# Patient Record
Sex: FEMALE | Race: ASIAN | NOT HISPANIC OR LATINO | Employment: FULL TIME | URBAN - METROPOLITAN AREA
[De-identification: names, ages, dates, MRNs, and addresses within clinical notes are randomized per-mention and may not be internally consistent; named-entity substitution may affect disease eponyms.]

---

## 2023-06-07 LAB
ALANINE AMINOTRANSFERASE (SGPT) (U/L) IN SER/PLAS: 11 U/L (ref 7–45)
ALBUMIN (G/DL) IN SER/PLAS: 4.3 G/DL (ref 3.4–5)
ALKALINE PHOSPHATASE (U/L) IN SER/PLAS: 42 U/L (ref 33–110)
ANION GAP IN SER/PLAS: 10 MMOL/L (ref 10–20)
ASPARTATE AMINOTRANSFERASE (SGOT) (U/L) IN SER/PLAS: 17 U/L (ref 9–39)
BILIRUBIN TOTAL (MG/DL) IN SER/PLAS: 0.5 MG/DL (ref 0–1.2)
CALCIUM (MG/DL) IN SER/PLAS: 9.3 MG/DL (ref 8.6–10.3)
CARBON DIOXIDE, TOTAL (MMOL/L) IN SER/PLAS: 28 MMOL/L (ref 21–32)
CHLORIDE (MMOL/L) IN SER/PLAS: 104 MMOL/L (ref 98–107)
CREATININE (MG/DL) IN SER/PLAS: 0.79 MG/DL (ref 0.5–1.05)
ERYTHROCYTE DISTRIBUTION WIDTH (RATIO) BY AUTOMATED COUNT: 15.1 % (ref 11.5–14.5)
ERYTHROCYTE MEAN CORPUSCULAR HEMOGLOBIN CONCENTRATION (G/DL) BY AUTOMATED: 30.7 G/DL (ref 32–36)
ERYTHROCYTE MEAN CORPUSCULAR VOLUME (FL) BY AUTOMATED COUNT: 79 FL (ref 80–100)
ERYTHROCYTES (10*6/UL) IN BLOOD BY AUTOMATED COUNT: 5.06 X10E12/L (ref 4–5.2)
GFR FEMALE: >90 ML/MIN/1.73M2
GLUCOSE (MG/DL) IN SER/PLAS: 93 MG/DL (ref 74–99)
HEMATOCRIT (%) IN BLOOD BY AUTOMATED COUNT: 40.1 % (ref 36–46)
HEMOGLOBIN (G/DL) IN BLOOD: 12.3 G/DL (ref 12–16)
HEPATITIS B VIRUS CORE AB (PRESENCE) IN SER/PLAS BY IMM: NONREACTIVE
HEPATITIS B VIRUS SURFACE AG PRESENCE IN SERUM: NONREACTIVE
HEPATITIS C VIRUS AB PRESENCE IN SERUM: NONREACTIVE
HIV 1/ 2 AG/AB SCREEN: NONREACTIVE
LEUKOCYTES (10*3/UL) IN BLOOD BY AUTOMATED COUNT: 6.9 X10E9/L (ref 4.4–11.3)
PLATELETS (10*3/UL) IN BLOOD AUTOMATED COUNT: 257 X10E9/L (ref 150–450)
POTASSIUM (MMOL/L) IN SER/PLAS: 4.2 MMOL/L (ref 3.5–5.3)
PROTEIN TOTAL: 7.8 G/DL (ref 6.4–8.2)
SODIUM (MMOL/L) IN SER/PLAS: 138 MMOL/L (ref 136–145)
UREA NITROGEN (MG/DL) IN SER/PLAS: 9 MG/DL (ref 6–23)

## 2023-06-09 LAB
NIL(NEG) CONTROL SPOT COUNT: NORMAL
PANEL A SPOT COUNT: 0
PANEL B SPOT COUNT: 0
POS CONTROL SPOT COUNT: NORMAL
T-SPOT. TB INTERPRETATION: NEGATIVE

## 2023-10-06 DIAGNOSIS — L40.0 PSORIASIS VULGARIS: Primary | ICD-10-CM

## 2023-10-10 RX ORDER — RISANKIZUMAB-RZAA 150 MG/ML
INJECTION SUBCUTANEOUS
Qty: 1 ML | Refills: 1 | Status: SHIPPED | OUTPATIENT
Start: 2023-10-10 | End: 2024-01-24 | Stop reason: SDUPTHER

## 2023-10-11 ENCOUNTER — PHARMACY VISIT (OUTPATIENT)
Dept: PHARMACY | Facility: CLINIC | Age: 38
End: 2023-10-11
Payer: COMMERCIAL

## 2023-10-11 PROCEDURE — RXMED WILLOW AMBULATORY MEDICATION CHARGE

## 2023-10-18 ENCOUNTER — TELEPHONE (OUTPATIENT)
Dept: DERMATOLOGY | Facility: CLINIC | Age: 38
End: 2023-10-18

## 2023-10-18 NOTE — TELEPHONE ENCOUNTER
Received a fax from [employer] re: Maty MONREAL.  More information needed.  Request for last office visit notes. Faxed requested information to [phone], Ref Case #: [id number].   Rylee Barnes LPN

## 2023-10-25 ENCOUNTER — SPECIALTY PHARMACY (OUTPATIENT)
Dept: PHARMACY | Facility: CLINIC | Age: 38
End: 2023-10-25

## 2023-10-30 ENCOUNTER — SPECIALTY PHARMACY (OUTPATIENT)
Dept: PHARMACY | Facility: CLINIC | Age: 38
End: 2023-10-30

## 2024-01-17 ENCOUNTER — PHARMACY VISIT (OUTPATIENT)
Dept: PHARMACY | Facility: CLINIC | Age: 39
End: 2024-01-17
Payer: COMMERCIAL

## 2024-01-17 ENCOUNTER — SPECIALTY PHARMACY (OUTPATIENT)
Dept: PHARMACY | Facility: CLINIC | Age: 39
End: 2024-01-17

## 2024-01-17 PROCEDURE — RXMED WILLOW AMBULATORY MEDICATION CHARGE

## 2024-01-24 ENCOUNTER — OFFICE VISIT (OUTPATIENT)
Dept: DERMATOLOGY | Facility: CLINIC | Age: 39
End: 2024-01-24
Payer: COMMERCIAL

## 2024-01-24 DIAGNOSIS — M25.50 ARTHRALGIA, UNSPECIFIED JOINT: Primary | ICD-10-CM

## 2024-01-24 DIAGNOSIS — L40.9 PSORIASIS: ICD-10-CM

## 2024-01-24 DIAGNOSIS — L40.0 PSORIASIS VULGARIS: ICD-10-CM

## 2024-01-24 PROCEDURE — 99213 OFFICE O/P EST LOW 20 MIN: CPT | Performed by: STUDENT IN AN ORGANIZED HEALTH CARE EDUCATION/TRAINING PROGRAM

## 2024-01-24 PROCEDURE — 1036F TOBACCO NON-USER: CPT | Performed by: STUDENT IN AN ORGANIZED HEALTH CARE EDUCATION/TRAINING PROGRAM

## 2024-01-24 RX ORDER — RISANKIZUMAB-RZAA 150 MG/ML
INJECTION SUBCUTANEOUS
Qty: 1 ML | Refills: 3 | Status: SHIPPED | OUTPATIENT
Start: 2024-01-24 | End: 2025-01-23

## 2024-01-24 ASSESSMENT — DERMATOLOGY QUALITY OF LIFE (QOL) ASSESSMENT
RATE HOW BOTHERED YOU ARE BY SYMPTOMS OF YOUR SKIN PROBLEM (EG, ITCHING, STINGING BURNING, HURTING OR SKIN IRRITATION): 3
DATE THE QUALITY-OF-LIFE ASSESSMENT WAS COMPLETED: 66863
RATE HOW BOTHERED YOU ARE BY EFFECTS OF YOUR SKIN PROBLEMS ON YOUR ACTIVITIES (EG, GOING OUT, ACCOMPLISHING WHAT YOU WANT, WORK ACTIVITIES OR YOUR RELATIONSHIPS WITH OTHERS): 0 - NEVER BOTHERED
RATE HOW BOTHERED YOU ARE BY SYMPTOMS OF YOUR SKIN PROBLEM (EG, ITCHING, STINGING BURNING, HURTING OR SKIN IRRITATION): 3
RATE HOW EMOTIONALLY BOTHERED YOU ARE BY YOUR SKIN PROBLEM (FOR EXAMPLE, WORRY, EMBARRASSMENT, FRUSTRATION): 2
RATE HOW EMOTIONALLY BOTHERED YOU ARE BY YOUR SKIN PROBLEM (FOR EXAMPLE, WORRY, EMBARRASSMENT, FRUSTRATION): 2
WHAT SINGLE SKIN CONDITION LISTED BELOW IS THE PATIENT ANSWERING THE QUALITY-OF-LIFE ASSESSMENT QUESTIONS ABOUT: PSORIASIS
RATE HOW BOTHERED YOU ARE BY EFFECTS OF YOUR SKIN PROBLEMS ON YOUR ACTIVITIES (EG, GOING OUT, ACCOMPLISHING WHAT YOU WANT, WORK ACTIVITIES OR YOUR RELATIONSHIPS WITH OTHERS): 0 - NEVER BOTHERED
WHAT SINGLE SKIN CONDITION LISTED BELOW IS THE PATIENT ANSWERING THE QUALITY-OF-LIFE ASSESSMENT QUESTIONS ABOUT: PSORIASIS

## 2024-01-24 ASSESSMENT — PATIENT GLOBAL ASSESSMENT (PGA): WHAT IS THE PGA: PATIENT GLOBAL ASSESSMENT:  1 - CLEAR

## 2024-01-24 NOTE — PROGRESS NOTES
"Subjective   Celestino Felix is a 38 y.o. female who presents for the following: Psoriasis (LV: 7/19/23. Not currently flaring on body.  C/o itchy dry skin. Currently on Skyrizi (started 6/30/23), Cerave anti-itch moisturizer multiple times a day. Notes joint pain all day and generalized.  Notes that she will notice the joint pain worsens about 1 month after injection.  Takes Ibuprofen for pain. /).    The following portions of the chart were reviewed this encounter and updated as appropriate:         Review of Systems: Pertinent items are noted in HPI.    Objective   Well appearing patient in no apparent distress; mood and affect are within normal limits.    A focused examination was performed including extremities, including the arms, hands, fingers, and fingernails and the legs, feet, toes, and toenails, head, including the scalp, face, neck, nose, ears, eyelids, and lips, and chest, axillae, abdomen, back, and buttocks. All findings within normal limits unless otherwise noted below.    No plaques or PIH. No joint swelling.       Assessment/Plan   Arthralgia, unspecified joint    Related Procedures  Referral to Rheumatology  Follow Up In Dermatology - Established Patient    Psoriasis vulgaris    Related Medications  risankizumab-rzaa (Skyrizi) 150 mg/mL pen injector pen  INJECT 150MG (1 PEN) UNDER THE SKIN EVERY 12 WEEKS    Psoriasis    Mod-severe plaque psoriasis, well controlled on skyrizi without any significant adverse effects. Reports some injection site pain, but short lived and does not bother her too much. She wishes to continue the medication. Refills issued.   Last TB test neg 6/23  Her joint pains do not sound necessarily c/w psoriatic arthritis (last all day, located \"all over\"), but given concurrent mod-severe psoriasis, I would recommend she gets evaluated by rheum. Referral placed.         Scribe Attestation  By signing my name below, I, Rylee Barnes LPN , [place]   attest that this documentation has " been prepared under the direction and in the presence of Beka Mcnulty MD.

## 2024-02-01 ENCOUNTER — HOSPITAL ENCOUNTER (OUTPATIENT)
Dept: CARDIOLOGY | Facility: HOSPITAL | Age: 39
Discharge: HOME | End: 2024-02-01
Payer: COMMERCIAL

## 2024-02-01 DIAGNOSIS — R07.9 CHEST PAIN, UNSPECIFIED TYPE: Primary | ICD-10-CM

## 2024-02-01 DIAGNOSIS — R07.9 CHEST PAIN, UNSPECIFIED TYPE: ICD-10-CM

## 2024-02-01 LAB
AORTIC VALVE PEAK VELOCITY: 1.57 M/S
AV PEAK GRADIENT: 9.8 MMHG
AVA (PEAK VEL): 2.16 CM2
EJECTION FRACTION APICAL 4 CHAMBER: 65.6
EJECTION FRACTION: 68 %
LEFT ATRIUM VOLUME AREA LENGTH INDEX BSA: 18.6 ML/M2
LEFT VENTRICLE INTERNAL DIMENSION DIASTOLE: 4.75 CM (ref 3.5–6)
LEFT VENTRICULAR OUTFLOW TRACT DIAMETER: 1.8 CM
MITRAL VALVE E/A RATIO: 1.63
MITRAL VALVE E/E' RATIO: 7.63
RIGHT VENTRICLE FREE WALL PEAK S': 11 CM/S
TRICUSPID ANNULAR PLANE SYSTOLIC EXCURSION: 2.7 CM

## 2024-02-01 PROCEDURE — 93010 ELECTROCARDIOGRAM REPORT: CPT | Performed by: STUDENT IN AN ORGANIZED HEALTH CARE EDUCATION/TRAINING PROGRAM

## 2024-02-01 PROCEDURE — 93306 TTE W/DOPPLER COMPLETE: CPT

## 2024-02-01 PROCEDURE — 93306 TTE W/DOPPLER COMPLETE: CPT | Performed by: INTERNAL MEDICINE

## 2024-02-01 PROCEDURE — 93005 ELECTROCARDIOGRAM TRACING: CPT

## 2024-02-10 LAB
ATRIAL RATE: 60 BPM
P AXIS: 60 DEGREES
P OFFSET: 198 MS
P ONSET: 146 MS
PR INTERVAL: 146 MS
Q ONSET: 219 MS
QRS COUNT: 10 BEATS
QRS DURATION: 80 MS
QT INTERVAL: 420 MS
QTC CALCULATION(BAZETT): 420 MS
QTC FREDERICIA: 420 MS
R AXIS: 62 DEGREES
T AXIS: 52 DEGREES
T OFFSET: 429 MS
VENTRICULAR RATE: 60 BPM

## 2024-02-19 ENCOUNTER — TELEMEDICINE (OUTPATIENT)
Dept: PRIMARY CARE | Facility: CLINIC | Age: 39
End: 2024-02-19
Payer: COMMERCIAL

## 2024-02-19 DIAGNOSIS — L03.019 PARONYCHIA OF FINGER, UNSPECIFIED LATERALITY: Primary | ICD-10-CM

## 2024-02-19 PROCEDURE — 99213 OFFICE O/P EST LOW 20 MIN: CPT | Performed by: STUDENT IN AN ORGANIZED HEALTH CARE EDUCATION/TRAINING PROGRAM

## 2024-02-19 PROCEDURE — 1036F TOBACCO NON-USER: CPT | Performed by: STUDENT IN AN ORGANIZED HEALTH CARE EDUCATION/TRAINING PROGRAM

## 2024-02-19 RX ORDER — MUPIROCIN 20 MG/G
OINTMENT TOPICAL 3 TIMES DAILY
Qty: 22 G | Refills: 0 | Status: SHIPPED | OUTPATIENT
Start: 2024-02-19 | End: 2024-02-29

## 2024-02-19 RX ORDER — DOXYCYCLINE 100 MG/1
100 CAPSULE ORAL 2 TIMES DAILY
Qty: 14 CAPSULE | Refills: 0 | Status: SHIPPED | OUTPATIENT
Start: 2024-02-19 | End: 2024-02-26

## 2024-02-19 ASSESSMENT — PATIENT HEALTH QUESTIONNAIRE - PHQ9
2. FEELING DOWN, DEPRESSED OR HOPELESS: NOT AT ALL
1. LITTLE INTEREST OR PLEASURE IN DOING THINGS: NOT AT ALL
SUM OF ALL RESPONSES TO PHQ9 QUESTIONS 1 AND 2: 0

## 2024-02-19 NOTE — PROGRESS NOTES
Subjective   Patient ID: Celestino Felix is a 38 y.o. female who presents for Infected Finger.  Today she is accompanied by alone.     HPI    1. Paronychia of finger of left hand index finger  Over the past week she started to notice a hangnail that ultimately increased and worsened to what appears to be an abscess/infection  She tried to drain this lesion over the weekend and unfortunately this continues to worsen    Finger is red, swollen, hot, and throbbing.      She has had one episode of this in the past and doxycycline worked well for her at that time.      Denies any fevers or chills or any other signs/symptoms  Wishes to discuss this further at today's visit       Current Outpatient Medications on File Prior to Visit   Medication Sig Dispense Refill    albuterol 108 (90 Base) MCG/ACT inhaler Inhale 2 puffs every 6 hours if needed for wheezing or shortness of breath.      levonorgestrel (Mirena) 21 mcg/24 hours (8 yrs) 52 mg IUD 52 mg by intrauterine route 1 time.      risankizumab-rzaa (Skyrizi) 150 mg/mL pen injector pen INJECT 150MG (1 PEN) UNDER THE SKIN EVERY 12 WEEKS 1 mL 3     No current facility-administered medications on file prior to visit.        No Known Allergies    Immunization History   Administered Date(s) Administered    Moderna SARS-CoV-2 Vaccination 01/06/2021, 02/05/2021, 12/18/2021    Pfizer COVID-19 vaccine, bivalent, age 12 years and older (30 mcg/0.3 mL) 11/05/2022       Review of Systems  All pertinent positive symptoms are included in the history of present illness.    All other systems have been reviewed and are negative and noncontributory to this patient's current ailments.     Objective   There were no vitals taken for this visit. - due to virtual nature of visit.   BSA: There is no height or weight on file to calculate BSA.  Growth percentiles: Facility age limit for growth %isaiah is 20 years. Facility age limit for growth %isaiah is 20 years.       Physical Exam    CONSTITUTIONAL -  well nourished, well developed, looks like stated age, in no acute distress, not ill-appearing, and not tired appearing  SKIN - red, swollen L index finger extending proximately from medial nail fold. Otherwise, normal skin color and pigmentation, normal skin turgor without rash, lesions, or nodules visualized  HEAD - no trauma, normocephalic  EYES - normal external exam  CHEST -no distressed breathing, good effort  NEUROLOGICAL - normal balance, normal motor, no ataxia  PSYCHIATRIC - alert, pleasant and cordial, age-appropriate      Assessment/Plan   1. 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  I encouraged supportive care such as rest, fluids and Advil/Tylenol as warranted  Return to the clinic in 7-10 days or sooner if symptoms worsen or persist as we will then further evaluate    Since this as been a recurring problem, I also provided an Rx for mupirocin antibiotic ointment to use in the future if you have a hangnail to avoid getting a secondary infection.

## 2024-03-05 NOTE — PROGRESS NOTES
Subjective   Patient ID: Celestino Felix is a 38 y.o. female who presents for Annual Exam, Abnormal Weight Gain, and Swelling in hands and feet.  Today she is accompanied by alone.     HPI  1. Health maintenance  Overall she feels very well without any major issues/complaints  Influenza vaccine October 2020 and Tdap vaccine 2019  Attempts to eat a well-balanced diet and exercises daily by running approximately 3 miles  Denies any tobacco use and drinks EtOH socially  Pap smear 1/2023 wnl hpv neg; Due: 1/2029  She is a mother of 3 children  Denies any chest pain, palpitations, shortness of breath, cough, nausea, vomiting, diarrhea, or any other acute signs/symptoms    2. Psoriasis/Psoriatic Arthritis  Has been on Skirizi for pso since July. Skin clear  PsA has been worse since December.   Patient has appointment with rheumatology in May  She is asking about getting lupus labs prior to that appointment    3. Weight gain  Patient endorses 20 pound weight gain  Weight in 2022 was 140, patient 155 today    4. Thalassemia/anemia  Patient has a past medical history of thalassemia but she is not fully sure on which type  Also along with this, she has a history of anemia  I did provide a requisition to follow-up with a hematologist at your last CPE.   Has yet to follow-up with a hematologist but is interested in doing so in the near future  Denies any excessive fatigue or any other signs/symptoms     5. Asthma  Has a past medical history of asthma that is very well controlled by using an albuterol inhaler on an as-needed basis  Denies any difficulty breathing and is just requesting a new prescription for an inhaler to be sent to her local pharmacy     6. Episodic Chest pain  20 min episode of mild chest pain while at rest  Endorses mental fogginess  Started in nov/dec  Reports having ECHO and EKG with her cardiologist which were within normal limits.     Current Outpatient Medications on File Prior to Visit   Medication Sig  "Dispense Refill    levonorgestrel (Mirena) 21 mcg/24 hours (8 yrs) 52 mg IUD 52 mg by intrauterine route 1 time.      [] mupirocin (Bactroban) 2 % ointment Apply topically 3 times a day for 10 days. apply to affected area 22 g 0    risankizumab-rzaa (Skyrizi) 150 mg/mL pen injector pen INJECT 150MG (1 PEN) UNDER THE SKIN EVERY 12 WEEKS 1 mL 3    [DISCONTINUED] albuterol 108 (90 Base) MCG/ACT inhaler Inhale 2 puffs every 6 hours if needed for wheezing or shortness of breath.       No current facility-administered medications on file prior to visit.        No Known Allergies    Immunization History   Administered Date(s) Administered    Hepatitis B vaccine, pediatric/adolescent (RECOMBIVAX, ENGERIX) 10/20/2010, 2010    Influenza, injectable, quadrivalent 2016, 2018    MMR vaccine, subcutaneous (MMR II) 1998    Moderna SARS-CoV-2 Vaccination 2021, 2021, 2021    Pfizer COVID-19 vaccine, bivalent, age 12 years and older (30 mcg/0.3 mL) 2022    Pneumococcal Conjugate PCV 7 1986    Tdap vaccine, age 7 year and older (BOOSTRIX, ADACEL) 10/05/2015, 2019         Review of Systems  All pertinent positive symptoms are included in the history of present illness.  All other systems have been reviewed and are negative and noncontributory to this patient's current ailments.     Objective   /70 (BP Location: Left arm, Patient Position: Sitting, BP Cuff Size: Adult)   Pulse 85   Ht 1.575 m (5' 2\")   Wt 70.3 kg (155 lb)   BMI 28.35 kg/m²   BSA: 1.75 meters squared  No visits with results within 1 Month(s) from this visit.   Latest known visit with results is:   Hospital Outpatient Visit on 2024   Component Date Value Ref Range Status    AV pk glynn 2024 1.57  m/s Final    LVOT diam 2024 1.80  cm Final    LV biplane EF 2024 68  % Final    MV E/A ratio 2024 1.63   Final    LA vol index A/L 2024 18.6  ml/m2 Final    MV avg E/e' " ratio 02/01/2024 7.63   Final    Tricuspid annular plane systolic e* 02/01/2024 2.7  cm Final    RV free wall pk S' 02/01/2024 11.00  cm/s Final    LVIDd 02/01/2024 4.75  cm Final    Aortic Valve Area by Continuity of* 02/01/2024 2.16  cm2 Final    AV pk grad 02/01/2024 9.8  mmHg Final    LV A4C EF 02/01/2024 65.6   Final       Physical Exam  CONSTITUTIONAL - well nourished, well developed, looks like stated age, in no acute distress, not ill-appearing, and not tired appearing  SKIN - normal skin color and pigmentation, normal skin turgor without rash, lesions, or nodules visualized  HEAD - no trauma, normocephalic  EYES - pupils are equal and reactive to light, extraocular muscles are intact, and normal external exam  ENT - TM's intact, no injection, no signs of infection, uvula midline, normal tongue movement and throat normal, no exudate, nasal passage without discharge and patent  NECK - supple without rigidity, no neck mass was observed  CHEST - clear to auscultation, no wheezing, no crackles and no rales, good effort  CARDIAC - regular rate and regular rhythm, no skipped beats, no murmur  ABDOMEN - no organomegaly, soft, nontender, nondistended, normal bowel sounds, no guarding/rebound/rigidity  EXTREMITIES - no edema, no deformities  NEUROLOGICAL - normal gait, normal balance, normal motor, no ataxia; alert, oriented and no focal signs  PSYCHIATRIC - alert, pleasant and cordial, age-appropriate  IMMUNOLOGIC - no cervical lymphadenopathy     Assessment/Plan       1. Health maintenance  Complete history and physical examination was performed  EKG reveals sinus bradycardia without acute changes  We will notify of test results once available and make treatment recommendations accordingly  Please continue eating a well-balanced diet and exercising regularly    2. Psoriasis/Psoriatic Arthritis  Has been on Skirizi for pso since July. Skin clear  PsA has been worse since December.   Patient has appointment with  rheumatology in May  She is asking about getting lupus labs prior to that appointment    3. Weight gain  We will order a TSH and A1c with your blood work.  We will notify of test results once available and make treatment recommendations accordingly     4. Thalassemia/anemia  We had a long conversation about your thalassemia/anemia  I did provide a requisition to follow-up with a hematologist  Please make an appointment at your earliest mutual convenience to be further evaluated/treated     5. Asthma  A refill for your albuterol inhaler was sent to your local pharmacy  Please continue using this on an as needed basis    6. Episodic Chest Pain  Since you have been worked up by cardiology we will continue to monitor  This could be secondary to anxiety.   If your symptoms get worse please call our office or go to your nearest emergency dept.     Please contact the office if you have any further question/concerns

## 2024-03-06 ENCOUNTER — OFFICE VISIT (OUTPATIENT)
Dept: PRIMARY CARE | Facility: CLINIC | Age: 39
End: 2024-03-06
Payer: COMMERCIAL

## 2024-03-06 ENCOUNTER — LAB (OUTPATIENT)
Dept: LAB | Facility: LAB | Age: 39
End: 2024-03-06
Payer: COMMERCIAL

## 2024-03-06 VITALS
SYSTOLIC BLOOD PRESSURE: 102 MMHG | HEIGHT: 62 IN | BODY MASS INDEX: 28.52 KG/M2 | WEIGHT: 155 LBS | HEART RATE: 85 BPM | DIASTOLIC BLOOD PRESSURE: 70 MMHG

## 2024-03-06 DIAGNOSIS — R63.5 WEIGHT GAIN: ICD-10-CM

## 2024-03-06 DIAGNOSIS — D56.9 THALASSEMIA, UNSPECIFIED TYPE: ICD-10-CM

## 2024-03-06 DIAGNOSIS — J45.909 ASTHMA, UNSPECIFIED ASTHMA SEVERITY, UNSPECIFIED WHETHER COMPLICATED, UNSPECIFIED WHETHER PERSISTENT (HHS-HCC): ICD-10-CM

## 2024-03-06 DIAGNOSIS — L40.0 PSORIASIS VULGARIS: ICD-10-CM

## 2024-03-06 DIAGNOSIS — M25.50 ARTHRALGIA, UNSPECIFIED JOINT: ICD-10-CM

## 2024-03-06 DIAGNOSIS — Z00.00 HEALTHCARE MAINTENANCE: Primary | ICD-10-CM

## 2024-03-06 DIAGNOSIS — R07.89 CHEST DISCOMFORT: ICD-10-CM

## 2024-03-06 DIAGNOSIS — Z00.00 HEALTHCARE MAINTENANCE: ICD-10-CM

## 2024-03-06 PROBLEM — R21 RASH AND OTHER NONSPECIFIC SKIN ERUPTION: Status: RESOLVED | Noted: 2023-05-24 | Resolved: 2024-03-06

## 2024-03-06 PROBLEM — L03.019 PARONYCHIA OF FINGER: Status: RESOLVED | Noted: 2024-03-06 | Resolved: 2024-03-06

## 2024-03-06 PROBLEM — M54.2 CERVICAL MUSCLE PAIN: Status: RESOLVED | Noted: 2024-03-06 | Resolved: 2024-03-06

## 2024-03-06 PROBLEM — R44.2 OLFACTORY HALLUCINATIONS: Status: RESOLVED | Noted: 2024-03-06 | Resolved: 2024-03-06

## 2024-03-06 PROBLEM — I88.9 CERVICAL LYMPHADENITIS: Status: RESOLVED | Noted: 2024-03-06 | Resolved: 2024-03-06

## 2024-03-06 PROBLEM — D64.9 ANEMIA: Status: ACTIVE | Noted: 2024-03-06
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
## 2024-03-06 PROBLEM — M25.512 LEFT SHOULDER PAIN: Status: RESOLVED | Noted: 2024-03-06 | Resolved: 2024-03-06

## 2024-03-06 PROBLEM — S06.0XAA CONCUSSION INJURY OF BODY STRUCTURE: Status: RESOLVED | Noted: 2024-03-06 | Resolved: 2024-03-06

## 2024-03-06 PROBLEM — R53.82 CHRONIC FATIGUE: Status: RESOLVED | Noted: 2024-03-06 | Resolved: 2024-03-06

## 2024-03-06 PROBLEM — N94.6 DYSMENORRHEA: Status: RESOLVED | Noted: 2024-03-06 | Resolved: 2024-03-06
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
## 2024-03-06 PROBLEM — S06.0XAA CONCUSSION: Status: RESOLVED | Noted: 2024-03-06 | Resolved: 2024-03-06

## 2024-03-06 PROBLEM — R44.2 PHANTOSMIA: Status: RESOLVED | Noted: 2024-03-06 | Resolved: 2024-03-06

## 2024-03-06 PROBLEM — R30.0 DYSURIA: Status: RESOLVED | Noted: 2024-03-06 | Resolved: 2024-03-06

## 2024-03-06 PROBLEM — L70.9 ACNE: Status: RESOLVED | Noted: 2024-03-06 | Resolved: 2024-03-06

## 2024-03-06 PROBLEM — R51.9 HEADACHE: Status: RESOLVED | Noted: 2024-03-06 | Resolved: 2024-03-06

## 2024-03-06 PROBLEM — M54.2 NECK PAIN: Status: RESOLVED | Noted: 2024-03-06 | Resolved: 2024-03-06

## 2024-03-06 PROBLEM — L65.9 HAIR LOSS: Status: RESOLVED | Noted: 2024-03-06 | Resolved: 2024-03-06

## 2024-03-06 PROBLEM — R07.9 CHEST PAIN: Status: RESOLVED | Noted: 2024-02-01 | Resolved: 2024-03-06

## 2024-03-06 LAB
ALBUMIN SERPL BCP-MCNC: 4.5 G/DL (ref 3.4–5)
ALP SERPL-CCNC: 56 U/L (ref 33–110)
ALT SERPL W P-5'-P-CCNC: 12 U/L (ref 7–45)
ANION GAP SERPL CALC-SCNC: 13 MMOL/L (ref 10–20)
AST SERPL W P-5'-P-CCNC: 19 U/L (ref 9–39)
BASOPHILS # BLD AUTO: 0.07 X10*3/UL (ref 0–0.1)
BASOPHILS NFR BLD AUTO: 1 %
BILIRUB SERPL-MCNC: 0.4 MG/DL (ref 0–1.2)
BUN SERPL-MCNC: 10 MG/DL (ref 6–23)
CALCIUM SERPL-MCNC: 9.8 MG/DL (ref 8.6–10.6)
CENTROMERE B AB SER-ACNC: <0.2 AI
CHLORIDE SERPL-SCNC: 103 MMOL/L (ref 98–107)
CHOLEST SERPL-MCNC: 160 MG/DL (ref 0–199)
CHOLESTEROL/HDL RATIO: 3.8
CHROMATIN AB SERPL-ACNC: <0.2 AI
CO2 SERPL-SCNC: 29 MMOL/L (ref 21–32)
CREAT SERPL-MCNC: 0.77 MG/DL (ref 0.5–1.05)
DSDNA AB SER-ACNC: 1 IU/ML
EGFRCR SERPLBLD CKD-EPI 2021: >90 ML/MIN/1.73M*2
ENA JO1 AB SER QL IA: <0.2 AI
ENA RNP AB SER IA-ACNC: <0.2 AI
ENA SCL70 AB SER QL IA: <0.2 AI
ENA SM AB SER IA-ACNC: <0.2 AI
ENA SM+RNP AB SER QL IA: <0.2 AI
ENA SS-A AB SER IA-ACNC: <0.2 AI
ENA SS-B AB SER IA-ACNC: <0.2 AI
EOSINOPHIL # BLD AUTO: 0.38 X10*3/UL (ref 0–0.7)
EOSINOPHIL NFR BLD AUTO: 5.4 %
ERYTHROCYTE [DISTWIDTH] IN BLOOD BY AUTOMATED COUNT: 14.9 % (ref 11.5–14.5)
EST. AVERAGE GLUCOSE BLD GHB EST-MCNC: 103 MG/DL
GLUCOSE SERPL-MCNC: 89 MG/DL (ref 74–99)
HBA1C MFR BLD: 5.2 %
HCT VFR BLD AUTO: 41.4 % (ref 36–46)
HDLC SERPL-MCNC: 42.6 MG/DL
HGB BLD-MCNC: 12.3 G/DL (ref 12–16)
IMM GRANULOCYTES # BLD AUTO: 0.03 X10*3/UL (ref 0–0.7)
IMM GRANULOCYTES NFR BLD AUTO: 0.4 % (ref 0–0.9)
LDLC SERPL CALC-MCNC: 77 MG/DL
LYMPHOCYTES # BLD AUTO: 2.49 X10*3/UL (ref 1.2–4.8)
LYMPHOCYTES NFR BLD AUTO: 35.1 %
MCH RBC QN AUTO: 23.6 PG (ref 26–34)
MCHC RBC AUTO-ENTMCNC: 29.7 G/DL (ref 32–36)
MCV RBC AUTO: 80 FL (ref 80–100)
MONOCYTES # BLD AUTO: 0.3 X10*3/UL (ref 0.1–1)
MONOCYTES NFR BLD AUTO: 4.2 %
NEUTROPHILS # BLD AUTO: 3.83 X10*3/UL (ref 1.2–7.7)
NEUTROPHILS NFR BLD AUTO: 53.9 %
NON HDL CHOLESTEROL: 117 MG/DL (ref 0–149)
NRBC BLD-RTO: 0 /100 WBCS (ref 0–0)
PLATELET # BLD AUTO: 243 X10*3/UL (ref 150–450)
POC APPEARANCE, URINE: CLEAR
POC BILIRUBIN, URINE: NEGATIVE
POC BLOOD, URINE: NEGATIVE
POC COLOR, URINE: YELLOW
POC GLUCOSE, URINE: NEGATIVE MG/DL
POC KETONES, URINE: NEGATIVE MG/DL
POC LEUKOCYTES, URINE: NEGATIVE
POC NITRITE,URINE: NEGATIVE
POC PH, URINE: 6 PH
POC PROTEIN, URINE: NEGATIVE MG/DL
POC SPECIFIC GRAVITY, URINE: 1.02
POC UROBILINOGEN, URINE: 0.2 EU/DL
POTASSIUM SERPL-SCNC: 4 MMOL/L (ref 3.5–5.3)
PROT SERPL-MCNC: 7.9 G/DL (ref 6.4–8.2)
RBC # BLD AUTO: 5.21 X10*6/UL (ref 4–5.2)
RIBOSOMAL P AB SER-ACNC: <0.2 AI
SODIUM SERPL-SCNC: 141 MMOL/L (ref 136–145)
TRIGL SERPL-MCNC: 201 MG/DL (ref 0–149)
TSH SERPL-ACNC: 1.77 MIU/L (ref 0.44–3.98)
VLDL: 40 MG/DL (ref 0–40)
WBC # BLD AUTO: 7.1 X10*3/UL (ref 4.4–11.3)

## 2024-03-06 PROCEDURE — 80061 LIPID PANEL: CPT

## 2024-03-06 PROCEDURE — 85025 COMPLETE CBC W/AUTO DIFF WBC: CPT

## 2024-03-06 PROCEDURE — 86038 ANTINUCLEAR ANTIBODIES: CPT

## 2024-03-06 PROCEDURE — 36415 COLL VENOUS BLD VENIPUNCTURE: CPT

## 2024-03-06 PROCEDURE — 1036F TOBACCO NON-USER: CPT | Performed by: STUDENT IN AN ORGANIZED HEALTH CARE EDUCATION/TRAINING PROGRAM

## 2024-03-06 PROCEDURE — 84443 ASSAY THYROID STIM HORMONE: CPT

## 2024-03-06 PROCEDURE — 86235 NUCLEAR ANTIGEN ANTIBODY: CPT

## 2024-03-06 PROCEDURE — 93000 ELECTROCARDIOGRAM COMPLETE: CPT | Performed by: STUDENT IN AN ORGANIZED HEALTH CARE EDUCATION/TRAINING PROGRAM

## 2024-03-06 PROCEDURE — 81002 URINALYSIS NONAUTO W/O SCOPE: CPT | Performed by: STUDENT IN AN ORGANIZED HEALTH CARE EDUCATION/TRAINING PROGRAM

## 2024-03-06 PROCEDURE — 99395 PREV VISIT EST AGE 18-39: CPT | Performed by: STUDENT IN AN ORGANIZED HEALTH CARE EDUCATION/TRAINING PROGRAM

## 2024-03-06 PROCEDURE — 80053 COMPREHEN METABOLIC PANEL: CPT

## 2024-03-06 PROCEDURE — 86225 DNA ANTIBODY NATIVE: CPT

## 2024-03-06 PROCEDURE — 99213 OFFICE O/P EST LOW 20 MIN: CPT | Performed by: STUDENT IN AN ORGANIZED HEALTH CARE EDUCATION/TRAINING PROGRAM

## 2024-03-06 PROCEDURE — 83036 HEMOGLOBIN GLYCOSYLATED A1C: CPT

## 2024-03-06 RX ORDER — ALBUTEROL SULFATE 90 UG/1
2 AEROSOL, METERED RESPIRATORY (INHALATION) EVERY 4 HOURS PRN
Qty: 18 G | Refills: 5 | Status: SHIPPED | OUTPATIENT
Start: 2024-03-06 | End: 2025-03-06

## 2024-03-06 ASSESSMENT — PATIENT HEALTH QUESTIONNAIRE - PHQ9
1. LITTLE INTEREST OR PLEASURE IN DOING THINGS: NOT AT ALL
2. FEELING DOWN, DEPRESSED OR HOPELESS: NOT AT ALL
SUM OF ALL RESPONSES TO PHQ9 QUESTIONS 1 AND 2: 0

## 2024-03-07 LAB — ANA SER QL HEP2 SUBST: NEGATIVE

## 2024-03-07 NOTE — RESULT ENCOUNTER NOTE
Cholesterol looks good at 160, HDL 42, LDL 77, triglycerides 201    Complete blood cell count shows no anemia but continues to show a slightly lower MCH and MCHC which I am not concerned about    FRANCESCO panel completely negative for any rheumatological diseases    Hemoglobin A1c well within normal is at 5.2% alongside a normal thyroid    Sugar, kidneys, liver, electrolytes are all within normal limits    We are just waiting for the final KAELYN

## 2024-03-08 DIAGNOSIS — L70.9 ACNE, UNSPECIFIED ACNE TYPE: Primary | ICD-10-CM

## 2024-03-08 DIAGNOSIS — G43.809 OTHER MIGRAINE WITHOUT STATUS MIGRAINOSUS, NOT INTRACTABLE: ICD-10-CM

## 2024-03-08 RX ORDER — SUMATRIPTAN SUCCINATE 25 MG/1
25 TABLET ORAL ONCE AS NEEDED
Qty: 27 TABLET | Refills: 3 | Status: SHIPPED | OUTPATIENT
Start: 2024-03-08 | End: 2025-03-08

## 2024-03-08 RX ORDER — ERYTHROMYCIN AND BENZOYL PEROXIDE 30; 50 MG/G; MG/G
GEL TOPICAL 2 TIMES DAILY
Qty: 46.6 G | Refills: 1 | Status: SHIPPED | OUTPATIENT
Start: 2024-03-08 | End: 2025-03-08

## 2024-04-05 ENCOUNTER — SPECIALTY PHARMACY (OUTPATIENT)
Dept: PHARMACY | Facility: CLINIC | Age: 39
End: 2024-04-05

## 2024-04-05 PROCEDURE — RXMED WILLOW AMBULATORY MEDICATION CHARGE

## 2024-04-08 ENCOUNTER — PHARMACY VISIT (OUTPATIENT)
Dept: PHARMACY | Facility: CLINIC | Age: 39
End: 2024-04-08
Payer: COMMERCIAL

## 2024-05-13 ENCOUNTER — OFFICE VISIT (OUTPATIENT)
Dept: RHEUMATOLOGY | Facility: CLINIC | Age: 39
End: 2024-05-13
Payer: COMMERCIAL

## 2024-05-13 VITALS
DIASTOLIC BLOOD PRESSURE: 74 MMHG | TEMPERATURE: 98.2 F | BODY MASS INDEX: 29.44 KG/M2 | HEART RATE: 80 BPM | WEIGHT: 160 LBS | HEIGHT: 62 IN | SYSTOLIC BLOOD PRESSURE: 122 MMHG

## 2024-05-13 DIAGNOSIS — L40.50 PSORIATIC ARTHRITIS (MULTI): Primary | ICD-10-CM

## 2024-05-13 DIAGNOSIS — M25.50 ARTHRALGIA, UNSPECIFIED JOINT: ICD-10-CM

## 2024-05-13 PROCEDURE — 1036F TOBACCO NON-USER: CPT | Performed by: INTERNAL MEDICINE

## 2024-05-13 PROCEDURE — 99204 OFFICE O/P NEW MOD 45 MIN: CPT | Performed by: INTERNAL MEDICINE

## 2024-05-13 RX ORDER — MELOXICAM 15 MG/1
15 TABLET ORAL DAILY
Qty: 30 TABLET | Refills: 11 | Status: SHIPPED | OUTPATIENT
Start: 2024-05-13 | End: 2025-05-13

## 2024-05-13 ASSESSMENT — PAIN SCALES - GENERAL: PAINLEVEL: 0-NO PAIN
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
## 2024-05-30 ENCOUNTER — OFFICE VISIT (OUTPATIENT)
Dept: HEMATOLOGY/ONCOLOGY | Facility: CLINIC | Age: 39
End: 2024-05-30
Payer: COMMERCIAL

## 2024-05-30 VITALS
DIASTOLIC BLOOD PRESSURE: 80 MMHG | HEIGHT: 61 IN | WEIGHT: 159.28 LBS | BODY MASS INDEX: 30.07 KG/M2 | SYSTOLIC BLOOD PRESSURE: 124 MMHG | HEART RATE: 73 BPM | TEMPERATURE: 98.3 F | RESPIRATION RATE: 18 BRPM | OXYGEN SATURATION: 98 %

## 2024-05-30 DIAGNOSIS — D56.9 THALASSEMIA, UNSPECIFIED TYPE: Primary | ICD-10-CM

## 2024-05-30 DIAGNOSIS — M25.50 ARTHRALGIA, UNSPECIFIED JOINT: ICD-10-CM

## 2024-05-30 LAB
BASOPHILS # BLD AUTO: 0.04 X10*3/UL (ref 0–0.1)
BASOPHILS NFR BLD AUTO: 0.6 %
CRP SERPL-MCNC: 0.13 MG/DL
EOSINOPHIL # BLD AUTO: 0.4 X10*3/UL (ref 0–0.7)
EOSINOPHIL NFR BLD AUTO: 6.1 %
ERYTHROCYTE [DISTWIDTH] IN BLOOD BY AUTOMATED COUNT: 14.9 % (ref 11.5–14.5)
ERYTHROCYTE [SEDIMENTATION RATE] IN BLOOD BY WESTERGREN METHOD: 81 MM/H (ref 0–20)
FERRITIN SERPL-MCNC: 86 NG/ML (ref 8–150)
FOLATE SERPL-MCNC: 16.8 NG/ML
HCT VFR BLD AUTO: 39.2 % (ref 36–46)
HGB BLD-MCNC: 12.1 G/DL (ref 12–16)
IMM GRANULOCYTES # BLD AUTO: 0.01 X10*3/UL (ref 0–0.7)
IMM GRANULOCYTES NFR BLD AUTO: 0.2 % (ref 0–0.9)
IRON SATN MFR SERPL: 20 % (ref 25–45)
IRON SERPL-MCNC: 70 UG/DL (ref 35–150)
LYMPHOCYTES # BLD AUTO: 2 X10*3/UL (ref 1.2–4.8)
LYMPHOCYTES NFR BLD AUTO: 30.5 %
MCH RBC QN AUTO: 23.4 PG (ref 26–34)
MCHC RBC AUTO-ENTMCNC: 30.9 G/DL (ref 32–36)
MCV RBC AUTO: 76 FL (ref 80–100)
MONOCYTES # BLD AUTO: 0.32 X10*3/UL (ref 0.1–1)
MONOCYTES NFR BLD AUTO: 4.9 %
NEUTROPHILS # BLD AUTO: 3.79 X10*3/UL (ref 1.2–7.7)
NEUTROPHILS NFR BLD AUTO: 57.7 %
NRBC BLD-RTO: 0 /100 WBCS (ref 0–0)
PLATELET # BLD AUTO: 225 X10*3/UL (ref 150–450)
RBC # BLD AUTO: 5.16 X10*6/UL (ref 4–5.2)
TIBC SERPL-MCNC: 346 UG/DL (ref 240–445)
UIBC SERPL-MCNC: 276 UG/DL (ref 110–370)
VIT B12 SERPL-MCNC: 255 PG/ML (ref 211–911)
WBC # BLD AUTO: 6.6 X10*3/UL (ref 4.4–11.3)

## 2024-05-30 PROCEDURE — 82525 ASSAY OF COPPER: CPT | Performed by: NURSE PRACTITIONER

## 2024-05-30 PROCEDURE — 82607 VITAMIN B-12: CPT | Performed by: NURSE PRACTITIONER

## 2024-05-30 PROCEDURE — 99214 OFFICE O/P EST MOD 30 MIN: CPT | Performed by: NURSE PRACTITIONER

## 2024-05-30 PROCEDURE — 85025 COMPLETE CBC W/AUTO DIFF WBC: CPT | Performed by: NURSE PRACTITIONER

## 2024-05-30 PROCEDURE — 82728 ASSAY OF FERRITIN: CPT | Performed by: NURSE PRACTITIONER

## 2024-05-30 PROCEDURE — 85652 RBC SED RATE AUTOMATED: CPT | Performed by: NURSE PRACTITIONER

## 2024-05-30 PROCEDURE — 1036F TOBACCO NON-USER: CPT | Performed by: NURSE PRACTITIONER

## 2024-05-30 PROCEDURE — 83540 ASSAY OF IRON: CPT | Performed by: NURSE PRACTITIONER

## 2024-05-30 PROCEDURE — 83021 HEMOGLOBIN CHROMOTOGRAPHY: CPT | Performed by: NURSE PRACTITIONER

## 2024-05-30 PROCEDURE — 82746 ASSAY OF FOLIC ACID SERUM: CPT | Performed by: NURSE PRACTITIONER

## 2024-05-30 PROCEDURE — 86140 C-REACTIVE PROTEIN: CPT | Performed by: NURSE PRACTITIONER

## 2024-05-30 ASSESSMENT — COLUMBIA-SUICIDE SEVERITY RATING SCALE - C-SSRS
6. HAVE YOU EVER DONE ANYTHING, STARTED TO DO ANYTHING, OR PREPARED TO DO ANYTHING TO END YOUR LIFE?: NO
1. IN THE PAST MONTH, HAVE YOU WISHED YOU WERE DEAD OR WISHED YOU COULD GO TO SLEEP AND NOT WAKE UP?: NO
2. HAVE YOU ACTUALLY HAD ANY THOUGHTS OF KILLING YOURSELF?: NO

## 2024-05-30 ASSESSMENT — ENCOUNTER SYMPTOMS
LOSS OF SENSATION IN FEET: 1
OCCASIONAL FEELINGS OF UNSTEADINESS: 1
DEPRESSION: 0

## 2024-05-30 ASSESSMENT — PAIN SCALES - GENERAL: PAINLEVEL: 6

## 2024-05-30 NOTE — PROGRESS NOTES
Pt seen in office today for a new patient visit with courtney Bob CNP  for management of her thallassemia. She has been referred to our office by her PCP, Dr. Kate Pitts. She is  without complaints today and denies pain.     Medications, pharmacy preference and allergies were reviewed with patient and updated in the medical record.     Per orders, labs are to be collected in office today and she is to follow up with Courtney on a telehealth visit to review results on 6/27/24.    Our contact information was given to patient and they were encouraged to contact us with any questions or concerns. R    Patient verbalized understanding and agreement regarding discussed information via verbal feedback. Pt escorted to scheduling.

## 2024-05-30 NOTE — PROGRESS NOTES
Patient ID: Celestino Felix is a 38 y.o. female.  Referring Physician: Kate Pitts DO  [address]  [facility], [address]  [place] [address]  Primary Care Provider: Kate Pitts DO  Reason for Consult: Thalassemia   Date of Initial Consult: May 30, 2024      History of Present Illness:  Patient presents today for initial hematology consultation for thalassemia. At her most recent visit with her primary care provider it was suggested she follow up with hematology regarding her history of thalassemia. She reports being diagnosed with thalassemia many years ago but does not know details. She is unaware of any family history of thalassemia and reports her sister does not have thalassemia.  No anemia noted in lab work. She does have mild microcytosis with decrease in MCH and MCHC.   She has history of psoriasis as a child which went into remission until last fall when patient experienced a flair. She was seen by dermatology who started her on Skyrizi. Skyrizi provided significant improvement in psoriasis vulgaris; however, no improvement was appreciated in her joint aches and pains. She has now met with rheumatology has added meloxicam and is considering changing biologic agents.   In terms of symptoms her primary complaints are fatigue, weight gain, DEL TORO with any activity. She reports 30-40 pound weight gain in past year. She has intermittent swelling in her hands and her feet bilaterally (none currently). DEL TORO is chronic with no recent changes. She denies any fevers, chills or night sweats. No cough. Intermittent chest pain ECG and ECHO has been completed. Occasional nausea. No vomiting. Chronic constipation, no diarrhea.  History of migraines causing visual disturbance. Has seen neurology in the past, uses Imitrex as needed.     Review of Systems:  A review of systems has been completed and are negative for complaints except what is stated in the HPI and/or past medical  "history    Allergies:  NKDA    Medications:  Albuterol (ProAir), Benzamycin gel, Levonorgestrel (Mirena) IUD, Meloxicam, Risankizumab-rzaa (Skyrizi), Imitrex    Past Medical History:  Asthma, migraine, psoriasis and ?psoratic arthritis     Past Surgical History:  None    Social History:  . 3 kids (17, 14 and 4 year old))  Employment -  coordinator (vascular and cardiology)   Never smoker, very rare alcohol, no illicit's     Family History:  Mother - Arthritis and Hypertension  Father - unknown history (alcohol)   Sister - Eczema   No know family history of thalassemia     Vital Signs:  /80 (BP Location: Right arm, Patient Position: Sitting, BP Cuff Size: Adult long)   Pulse 73   Temp 36.8 °C (98.3 °F) (Temporal)   Resp 18   Ht (S) 1.555 m (5' 1.22\")   Wt 72.3 kg (159 lb 4.5 oz)   SpO2 98%   BMI 29.88 kg/m²     2023 65.32 kg   63.5 kg  2021 61.8 kg      Physical Exam:  ECO  Pain: 0  Constitutional: Well developed, awake/alert/oriented x3, no distress, alert and cooperative  Eyes: PER. sclera anicteric  ENMT: Oral mucosa moist  Respiratory/Thorax: Breathing is non-labored. Lungs are clear to auscultation bilaterally. No adventitious breath sounds  Cardiovascular: S1-S2. Regular rate and rhythm. No murmurs, rubs, or gallops appreciated  Gastrointestinal: Abdomen soft nontender, nondistended, normal active bowel sounds. No hepatosplenomegly  Musculoskeletal: ROM intact, no joint swelling, normal strength  Extremities: normal extremities, no cyanosis, no edema, no clubbing  Neurologic: alert and oriented x3. Nonfocal exam. No myoclonus  Psychological: Pleasant, appropriate and easily engaged     Lab Results:  2024  WBC 7.1, erythrocytes 5.21, hemoglobin 12.3, hematocrit 41.4, MCV 80, MCH 23.6, MCHC 29.7, platelets 243,000  Hemoglobin A1c 5.2, TSH 1.77  KAELYN negative    Assessment/Plan:  Patient presents today for initial hematology consultation for " history of thalassemia.  Patient reports being told as a child she had thalassemia.  She does not know of any family members who have thalassemia.  She has never required blood transfusion.  She has never had anemia.    Today we will repeat her CBC with differential, we will draw nutritional labs including iron panel, ferritin, folic acid, B12, copper.  We will also draw hemoglobin electrophoresis to understand her diagnosis of thalassemia.    Will plan phone follow-up in approximately 4 weeks time to review laboratory data and determine follow-up plan.    Chuckie Bob, APRN-CNP

## 2024-06-01 LAB — COPPER SERPL-MCNC: 104.4 UG/DL (ref 80–155)

## 2024-06-03 LAB
HEMOGLOBIN A2: 3.1 % (ref 2–3.5)
HEMOGLOBIN A: 94.4 % (ref 95.8–98)
HEMOGLOBIN F: 2.5 % (ref 0–2)
HEMOGLOBIN IDENTIFICATION INTERPRETATION: ABNORMAL
PATH REVIEW-HGB IDENTIFICATION: ABNORMAL

## 2024-06-26 ENCOUNTER — APPOINTMENT (OUTPATIENT)
Dept: DERMATOLOGY | Facility: CLINIC | Age: 39
End: 2024-06-26
Payer: COMMERCIAL

## 2024-06-27 ENCOUNTER — TELEMEDICINE (OUTPATIENT)
Dept: HEMATOLOGY/ONCOLOGY | Facility: CLINIC | Age: 39
End: 2024-06-27
Payer: COMMERCIAL

## 2024-06-27 DIAGNOSIS — D56.9 THALASSEMIA, UNSPECIFIED TYPE: ICD-10-CM

## 2024-06-27 PROCEDURE — 99212 OFFICE O/P EST SF 10 MIN: CPT | Performed by: NURSE PRACTITIONER

## 2024-06-27 NOTE — PROGRESS NOTES
Consent:  Verbal consent was requested and obtained from patient on this date for a telehealth visit.    Patient ID: Celestino Felix is a 38 y.o. female.  Referring Physician: Kate Pitts DO  [address]  [facility], [address]  [place] [address]  Primary Care Provider: Kate Pitts DO  Reason for Consult: Thalassemia   Date of Initial Consult: May 30, 2024      History of Present Illness:  Patient presents today for initial hematology consultation for thalassemia. At her most recent visit with her primary care provider it was suggested she follow up with hematology regarding her history of thalassemia. She reports being diagnosed with thalassemia many years ago but does not know details. She is unaware of any family history of thalassemia and reports her sister does not have thalassemia.  No anemia noted in lab work. She does have mild microcytosis with decrease in MCH and MCHC.   She has history of psoriasis as a child which went into remission until last fall when patient experienced a flair. She was seen by dermatology who started her on Skyrizi. Skyrizi provided significant improvement in psoriasis vulgaris; however, no improvement was appreciated in her joint aches and pains. She has now met with rheumatology has added meloxicam and is considering changing biologic agents.   In terms of symptoms her primary complaints are fatigue, weight gain, DEL TORO with any activity. She reports 30-40 pound weight gain in past year. She has intermittent swelling in her hands and her feet bilaterally (none currently). DELT ORO is chronic with no recent changes. She denies any fevers, chills or night sweats. No cough. Intermittent chest pain ECG and ECHO has been completed. Occasional nausea. No vomiting. Chronic constipation, no diarrhea.  History of migraines causing visual disturbance. Has seen neurology in the past, uses Imitrex as needed.     June 27, 2024  Patient is contacted today for review of  laboratory date from consult visit. 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 Reviewed that her mild microcytosis is an expected result for patients with alpha-thalassemia minor, patients may also be noted to have mild anemias. I educated her that she is not iron deficient and should not be placed on iron replacement for her microcytosis if she becomes anemia unless iron deficiency is established. Genetic testing could be considered for her children.     Review of Systems:  A review of systems has been completed and are negative for complaints except what is stated in the HPI and/or past medical history    Allergies:  NKDA    Medications:  Albuterol (ProAir), Benzamycin gel, Levonorgestrel (Mirena) IUD, Meloxicam, Risankizumab-rzaa (Skyrizi), Imitrex    Past Medical History:  Asthma, migraine, psoriasis and ?psoratic arthritis     Past Surgical History:  None    Social History:  . 3 kids (17, 14 and 4 year old))  Employment -  coordinator (vascular and cardiology)   Never smoker, very rare alcohol, no illicit's     Family History:  Mother - Arthritis and Hypertension  Father - unknown history (alcohol)   Sister - Eczema   No know family history of thalassemia     Vital Signs/Physical Exam:  Telehealth visit    Lab Results:  March 6, 2024  WBC 7.1, erythrocytes 5.21, hemoglobin 12.3, hematocrit 41.4, MCV 80, MCH 23.6, MCHC 29.7, platelets 243,000  Hemoglobin A1c 5.2, TSH 1.77  KAELYN negative    May 30, 2024  WBC 6.6, erythrocytes 5.16, hemoglobin 12.1, hematocrit 39.2, MCV 76, MCH 23.4, MCHC 30.9, platelets 225,000  Ferritin 86, iron 70, TIBC 346, % sat 20  Copper 104.4, vitamin B12 255    Assessment/Plan:  Patient presents today for initial hematology consultation for history of thalassemia.  Patient reports being told as a child she had thalassemia.  She does not know of any family members who have thalassemia.  She has never required blood transfusion.  She has never had  anemia.    Today we will repeat her CBC with differential, we will draw nutritional labs including iron panel, ferritin, folic acid, B12, copper.  We will also draw hemoglobin electrophoresis to understand her diagnosis of thalassemia.    June 27, 2024  Patient contacted to review lab results from consult visit. Labs reviewed with patient and patient educated about Alpha-Thalassemia Minor. Labs and education as noted above.   She verbalized understanding and has no further questions.     Follow-up SANDRAN         Chuckie Bob, LORENE-CNP

## 2024-07-01 ENCOUNTER — SPECIALTY PHARMACY (OUTPATIENT)
Dept: PHARMACY | Facility: CLINIC | Age: 39
End: 2024-07-01

## 2024-07-01 ENCOUNTER — PHARMACY VISIT (OUTPATIENT)
Dept: PHARMACY | Facility: CLINIC | Age: 39
End: 2024-07-01
Payer: COMMERCIAL

## 2024-07-01 PROCEDURE — RXMED WILLOW AMBULATORY MEDICATION CHARGE

## 2024-07-02 ENCOUNTER — TELEPHONE (OUTPATIENT)
Dept: HEMATOLOGY/ONCOLOGY | Facility: CLINIC | Age: 39
End: 2024-07-02
Payer: COMMERCIAL

## 2024-08-07 ENCOUNTER — APPOINTMENT (OUTPATIENT)
Dept: DERMATOLOGY | Facility: CLINIC | Age: 39
End: 2024-08-07
Payer: COMMERCIAL

## 2024-08-07 DIAGNOSIS — L40.0 PSORIASIS VULGARIS: ICD-10-CM

## 2024-08-19 ENCOUNTER — APPOINTMENT (OUTPATIENT)
Dept: RHEUMATOLOGY | Facility: CLINIC | Age: 39
End: 2024-08-19
Payer: COMMERCIAL

## 2024-08-27 ENCOUNTER — SPECIALTY PHARMACY (OUTPATIENT)
Dept: PHARMACY | Facility: CLINIC | Age: 39
End: 2024-08-27

## 2024-09-13 ENCOUNTER — LAB (OUTPATIENT)
Dept: LAB | Facility: LAB | Age: 39
End: 2024-09-13
Payer: COMMERCIAL

## 2024-09-24 ENCOUNTER — SPECIALTY PHARMACY (OUTPATIENT)
Dept: PHARMACY | Facility: CLINIC | Age: 39
End: 2024-09-24

## 2024-09-26 NOTE — PROGRESS NOTES
Subjective   Patient ID: Celestino Felix is a 39 y.o. female who presents for Annual Exam.    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 [employer].     IUDM Mirena INSERT - , occasional spotting. Had one heavier day.      SVDs, 18, 15, 5. Has a PCP, Thalasemia. Asthma.     Always cramps with periods. Comes and goes.  Still have with IUD. Pill didn't help. Periods lighter but still cramping. Takes Advil or Alleve. Cramping lasts 4 days, whole time bleeding. Not regular.   Now cramping on the sides. More on right side a couple weeks ago, happened once or twice. Bowels regular. No urinary symptoms.      Review of Systems   Genitourinary:         Nausea.   Musculoskeletal:  Positive for back pain.   Psychiatric/Behavioral:          Anxiety.   All other systems reviewed and are negative.      Objective   Constitutional: Alert and in no acute distress. Well developed, well nourished.  Neck: no neck asymmetry. Supple and thyroid not enlarged and there were no palpable thyroid nodules.  Chest: Breasts normal appearance, no nipple discharge and no skin changes and palpation of breasts and axillae: no palpable mass and no axillary lymphadenopathy.  Abdomen: soft nontender; no abdominal mass palpated, no organomegaly and no hernias.  Genitourinary: external genitalia: normal, no inguinal lymphadenopathy, Bartholin's urethral and [place]'s glands: normal, urethra: normal and bladder: normal on palpation.  Vagina: normal.  Cervix: normal. Strings seen.  Uterus: normal.   Right adnexa/parametria: normal.  Left adnexa/parametria: normal.  Skin: normal skin color and pigmentation, normal skin turgor and no rash.  Psychiatric: alert and oriented x 3, affect normal to patient baseline and mood appropriate.     Assessment/Plan   -No pap  -Will let me know if she gets the pain on the her right side again, will then get  ultrasound.  -Gardasil handout given. Recommend.  -Continue with current Mirena unless bleeding changes.

## 2024-09-27 ENCOUNTER — APPOINTMENT (OUTPATIENT)
Dept: OBSTETRICS AND GYNECOLOGY | Facility: CLINIC | Age: 39
End: 2024-09-27
Payer: COMMERCIAL

## 2024-09-27 VITALS
BODY MASS INDEX: 27.75 KG/M2 | WEIGHT: 147 LBS | SYSTOLIC BLOOD PRESSURE: 100 MMHG | DIASTOLIC BLOOD PRESSURE: 60 MMHG | HEIGHT: 61 IN

## 2024-09-27 DIAGNOSIS — Z32.02 PREGNANCY TEST NEGATIVE: ICD-10-CM

## 2024-09-27 DIAGNOSIS — Z01.419 WELL WOMAN EXAM WITH ROUTINE GYNECOLOGICAL EXAM: Primary | ICD-10-CM

## 2024-09-27 LAB — PREGNANCY TEST URINE, POC: NEGATIVE

## 2024-09-27 PROCEDURE — 99395 PREV VISIT EST AGE 18-39: CPT | Performed by: OBSTETRICS & GYNECOLOGY

## 2024-09-27 PROCEDURE — 81025 URINE PREGNANCY TEST: CPT | Performed by: OBSTETRICS & GYNECOLOGY

## 2024-09-27 PROCEDURE — 3008F BODY MASS INDEX DOCD: CPT | Performed by: OBSTETRICS & GYNECOLOGY

## 2024-09-27 PROCEDURE — 1036F TOBACCO NON-USER: CPT | Performed by: OBSTETRICS & GYNECOLOGY

## 2024-09-27 ASSESSMENT — ENCOUNTER SYMPTOMS: BACK PAIN: 1

## 2024-10-02 ENCOUNTER — SPECIALTY PHARMACY (OUTPATIENT)
Dept: PHARMACY | Facility: CLINIC | Age: 39
End: 2024-10-02

## 2024-10-02 PROCEDURE — RXMED WILLOW AMBULATORY MEDICATION CHARGE

## 2024-10-03 ENCOUNTER — SPECIALTY PHARMACY (OUTPATIENT)
Dept: PHARMACY | Facility: CLINIC | Age: 39
End: 2024-10-03

## 2024-10-04 ENCOUNTER — PHARMACY VISIT (OUTPATIENT)
Dept: PHARMACY | Facility: CLINIC | Age: 39
End: 2024-10-04
Payer: COMMERCIAL

## 2024-11-08 ENCOUNTER — OFFICE VISIT (OUTPATIENT)
Dept: URGENT CARE | Age: 39
End: 2024-11-08
Payer: COMMERCIAL

## 2024-11-08 ENCOUNTER — ANCILLARY PROCEDURE (OUTPATIENT)
Dept: URGENT CARE | Age: 39
End: 2024-11-08
Payer: COMMERCIAL

## 2024-11-08 VITALS
HEART RATE: 81 BPM | DIASTOLIC BLOOD PRESSURE: 70 MMHG | OXYGEN SATURATION: 97 % | HEIGHT: 61 IN | TEMPERATURE: 98.3 F | RESPIRATION RATE: 20 BRPM | WEIGHT: 135 LBS | SYSTOLIC BLOOD PRESSURE: 112 MMHG | BODY MASS INDEX: 25.49 KG/M2

## 2024-11-08 DIAGNOSIS — J20.9 ACUTE BRONCHITIS, UNSPECIFIED ORGANISM: Primary | ICD-10-CM

## 2024-11-08 DIAGNOSIS — R05.1 ACUTE COUGH: ICD-10-CM

## 2024-11-08 PROCEDURE — 71046 X-RAY EXAM CHEST 2 VIEWS: CPT | Performed by: PHYSICIAN ASSISTANT

## 2024-11-08 RX ORDER — PREDNISONE 20 MG/1
TABLET ORAL
Qty: 10 TABLET | Refills: 0 | Status: SHIPPED | OUTPATIENT
Start: 2024-11-08

## 2024-11-08 RX ORDER — AZITHROMYCIN 250 MG/1
TABLET, FILM COATED ORAL
Qty: 6 TABLET | Refills: 0 | Status: SHIPPED | OUTPATIENT
Start: 2024-11-08

## 2024-11-08 ASSESSMENT — ENCOUNTER SYMPTOMS
COUGH: 1
FEVER: 1
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
## 2024-12-20 ENCOUNTER — TELEMEDICINE CLINICAL SUPPORT (OUTPATIENT)
Dept: PHARMACY | Facility: HOSPITAL | Age: 39
End: 2024-12-20
Payer: COMMERCIAL

## 2024-12-31 ENCOUNTER — SPECIALTY PHARMACY (OUTPATIENT)
Dept: PHARMACY | Facility: CLINIC | Age: 39
End: 2024-12-31

## 2024-12-31 PROCEDURE — RXMED WILLOW AMBULATORY MEDICATION CHARGE

## 2025-01-06 ENCOUNTER — PHARMACY VISIT (OUTPATIENT)
Dept: PHARMACY | Facility: CLINIC | Age: 40
End: 2025-01-06
Payer: COMMERCIAL

## 2025-03-03 ENCOUNTER — SPECIALTY PHARMACY (OUTPATIENT)
Dept: PHARMACY | Facility: CLINIC | Age: 40
End: 2025-03-03

## 2025-03-03 DIAGNOSIS — L40.0 PSORIASIS VULGARIS: ICD-10-CM

## 2025-03-05 DIAGNOSIS — L40.0 PSORIASIS VULGARIS: ICD-10-CM

## 2025-03-05 RX ORDER — RISANKIZUMAB-RZAA 150 MG/ML
INJECTION SUBCUTANEOUS
Qty: 1 ML | Refills: 0 | Status: CANCELLED | OUTPATIENT
Start: 2025-03-05 | End: 2026-03-05

## 2025-03-06 RX ORDER — RISANKIZUMAB-RZAA 150 MG/ML
INJECTION SUBCUTANEOUS
Qty: 1 ML | Refills: 0 | Status: SHIPPED | OUTPATIENT
Start: 2025-03-06 | End: 2026-03-06

## 2025-03-07 ENCOUNTER — SPECIALTY PHARMACY (OUTPATIENT)
Dept: PHARMACY | Facility: CLINIC | Age: 40
End: 2025-03-07

## 2025-03-12 ENCOUNTER — SPECIALTY PHARMACY (OUTPATIENT)
Dept: PHARMACY | Facility: CLINIC | Age: 40
End: 2025-03-12

## 2025-03-12 PROCEDURE — RXMED WILLOW AMBULATORY MEDICATION CHARGE

## 2025-03-18 ENCOUNTER — PHARMACY VISIT (OUTPATIENT)
Dept: PHARMACY | Facility: CLINIC | Age: 40
End: 2025-03-18
Payer: COMMERCIAL

## 2025-05-27 ENCOUNTER — SPECIALTY PHARMACY (OUTPATIENT)
Dept: PHARMACY | Facility: CLINIC | Age: 40
End: 2025-05-27

## 2025-05-27 DIAGNOSIS — L40.0 PSORIASIS VULGARIS: ICD-10-CM

## 2025-05-27 RX ORDER — RISANKIZUMAB-RZAA 150 MG/ML
INJECTION SUBCUTANEOUS
Qty: 1 ML | Refills: 0 | Status: SHIPPED | OUTPATIENT
Start: 2025-05-27 | End: 2026-05-27

## 2025-06-11 ENCOUNTER — SPECIALTY PHARMACY (OUTPATIENT)
Dept: PHARMACY | Facility: CLINIC | Age: 40
End: 2025-06-11

## 2025-06-18 ENCOUNTER — SPECIALTY PHARMACY (OUTPATIENT)
Dept: PHARMACY | Facility: CLINIC | Age: 40
End: 2025-06-18

## 2025-08-07 ENCOUNTER — SPECIALTY PHARMACY (OUTPATIENT)
Dept: PHARMACY | Facility: CLINIC | Age: 40
End: 2025-08-07

## 2025-08-07 PROCEDURE — RXMED WILLOW AMBULATORY MEDICATION CHARGE

## 2025-08-12 ENCOUNTER — PHARMACY VISIT (OUTPATIENT)
Dept: PHARMACY | Facility: CLINIC | Age: 40
End: 2025-08-12
Payer: COMMERCIAL

## 2025-08-20 ENCOUNTER — TELEMEDICINE (OUTPATIENT)
Dept: DERMATOLOGY | Facility: CLINIC | Age: 40
End: 2025-08-20
Payer: COMMERCIAL

## 2025-08-20 DIAGNOSIS — H02.409 RHYTIDES AND PTOSIS DUE TO AGING: ICD-10-CM

## 2025-08-20 DIAGNOSIS — L98.8 RHYTIDES AND PTOSIS DUE TO AGING: ICD-10-CM

## 2025-08-20 DIAGNOSIS — L40.9 PSORIASIS: Primary | ICD-10-CM

## 2025-08-20 PROCEDURE — 99213 OFFICE O/P EST LOW 20 MIN: CPT | Performed by: STUDENT IN AN ORGANIZED HEALTH CARE EDUCATION/TRAINING PROGRAM

## 2025-08-20 RX ORDER — TRETINOIN 0.5 MG/G
CREAM TOPICAL
Qty: 90 G | Refills: 11 | Status: SHIPPED | OUTPATIENT
Start: 2025-08-20 | End: 2025-08-25 | Stop reason: SDUPTHER

## 2025-08-25 DIAGNOSIS — H02.409 RHYTIDES AND PTOSIS DUE TO AGING: ICD-10-CM

## 2025-08-25 DIAGNOSIS — L98.8 RHYTIDES AND PTOSIS DUE TO AGING: ICD-10-CM

## 2025-08-25 RX ORDER — TRETINOIN 0.5 MG/G
CREAM TOPICAL
Qty: 90 G | Refills: 11 | Status: SHIPPED | OUTPATIENT
Start: 2025-08-25